# Patient Record
Sex: MALE | Race: WHITE | Employment: OTHER | URBAN - METROPOLITAN AREA
[De-identification: names, ages, dates, MRNs, and addresses within clinical notes are randomized per-mention and may not be internally consistent; named-entity substitution may affect disease eponyms.]

---

## 2017-01-24 ENCOUNTER — APPOINTMENT (OUTPATIENT)
Dept: LAB | Age: 62
End: 2017-01-24
Attending: PHYSICIAN ASSISTANT
Payer: COMMERCIAL

## 2017-01-24 PROCEDURE — G0480 DRUG TEST DEF 1-7 CLASSES: HCPCS | Performed by: PHYSICIAN ASSISTANT

## 2017-01-24 PROCEDURE — 80307 DRUG TEST PRSMV CHEM ANLYZR: CPT | Performed by: PHYSICIAN ASSISTANT

## 2017-03-09 RX ORDER — CARVEDILOL 3.12 MG/1
3.12 TABLET ORAL 2 TIMES DAILY
COMMUNITY
End: 2017-08-24 | Stop reason: ALTCHOICE

## 2017-03-09 RX ORDER — BUPRENORPHINE HCL 8 MG/1
1 TABLET SUBLINGUAL DAILY
COMMUNITY

## 2017-03-09 RX ORDER — LISINOPRIL 10 MG/1
10 TABLET ORAL DAILY
COMMUNITY
End: 2017-08-24 | Stop reason: ALTCHOICE

## 2017-03-14 ENCOUNTER — APPOINTMENT (OUTPATIENT)
Dept: PHYSICAL THERAPY | Facility: HOSPITAL | Age: 62
End: 2017-03-14
Attending: ORTHOPAEDIC SURGERY
Payer: COMMERCIAL

## 2017-03-21 ENCOUNTER — APPOINTMENT (OUTPATIENT)
Dept: LAB | Facility: HOSPITAL | Age: 62
End: 2017-03-21
Attending: ORTHOPAEDIC SURGERY
Payer: COMMERCIAL

## 2017-03-21 ENCOUNTER — APPOINTMENT (OUTPATIENT)
Dept: PHYSICAL THERAPY | Facility: HOSPITAL | Age: 62
End: 2017-03-21
Attending: ORTHOPAEDIC SURGERY
Payer: COMMERCIAL

## 2017-03-21 ENCOUNTER — HOSPITAL ENCOUNTER (OUTPATIENT)
Dept: GENERAL RADIOLOGY | Facility: HOSPITAL | Age: 62
Discharge: HOME | End: 2017-03-21
Attending: ORTHOPAEDIC SURGERY
Payer: COMMERCIAL

## 2017-03-21 DIAGNOSIS — M48.061 LUMBAR STENOSIS: ICD-10-CM

## 2017-03-21 LAB
ALBUMIN SERPL-MCNC: 3.7 G/DL (ref 3.5–4.8)
ALP LIVER SERPL-CCNC: 96 U/L (ref 45–117)
ALT SERPL-CCNC: 34 U/L (ref 17–63)
APTT PPP: 24.1 SECONDS (ref 25–34)
AST SERPL-CCNC: 21 U/L (ref 15–41)
BASOPHILS # BLD AUTO: 0.05 X10(3) UL (ref 0–0.1)
BASOPHILS NFR BLD AUTO: 0.9 %
BILIRUB SERPL-MCNC: 0.5 MG/DL (ref 0.1–2)
BILIRUB UR QL STRIP.AUTO: NEGATIVE
BUN BLD-MCNC: 9 MG/DL (ref 8–20)
CALCIUM BLD-MCNC: 8.9 MG/DL (ref 8.3–10.3)
CHLORIDE: 102 MMOL/L (ref 101–111)
CLARITY UR REFRACT.AUTO: CLEAR
CO2: 32 MMOL/L (ref 22–32)
COLOR UR AUTO: YELLOW
CREAT BLD-MCNC: 0.99 MG/DL (ref 0.7–1.3)
EOSINOPHIL # BLD AUTO: 0.09 X10(3) UL (ref 0–0.3)
EOSINOPHIL NFR BLD AUTO: 1.6 %
ERYTHROCYTE [DISTWIDTH] IN BLOOD BY AUTOMATED COUNT: 14.1 % (ref 11.5–16)
GLUCOSE BLD-MCNC: 93 MG/DL (ref 70–99)
GLUCOSE UR STRIP.AUTO-MCNC: NEGATIVE MG/DL
HCT VFR BLD AUTO: 51.3 % (ref 37–53)
HGB BLD-MCNC: 16.4 G/DL (ref 13–17)
IMMATURE GRANULOCYTE COUNT: 0.04 X10(3) UL (ref 0–1)
IMMATURE GRANULOCYTE RATIO %: 0.7 %
INR BLD: 0.92 (ref 0.89–1.11)
KETONES UR STRIP.AUTO-MCNC: NEGATIVE MG/DL
LYMPHOCYTES # BLD AUTO: 1.28 X10(3) UL (ref 0.9–4)
LYMPHOCYTES NFR BLD AUTO: 22.7 %
M PROTEIN MFR SERPL ELPH: 7.1 G/DL (ref 6.1–8.3)
MCH RBC QN AUTO: 28.8 PG (ref 27–33.2)
MCHC RBC AUTO-ENTMCNC: 32 G/DL (ref 31–37)
MCV RBC AUTO: 90.2 FL (ref 80–99)
MONOCYTES # BLD AUTO: 0.52 X10(3) UL (ref 0.1–0.6)
MONOCYTES NFR BLD AUTO: 9.2 %
NEUTROPHIL ABS PRELIM: 3.65 X10 (3) UL (ref 1.3–6.7)
NEUTROPHILS # BLD AUTO: 3.65 X10(3) UL (ref 1.3–6.7)
NEUTROPHILS NFR BLD AUTO: 64.9 %
NITRITE UR QL STRIP.AUTO: NEGATIVE
PH UR STRIP.AUTO: 5 [PH] (ref 4.5–8)
PLATELET # BLD AUTO: 227 10(3)UL (ref 150–450)
POTASSIUM SERPL-SCNC: 4.3 MMOL/L (ref 3.6–5.1)
PROT UR STRIP.AUTO-MCNC: NEGATIVE MG/DL
PSA SERPL DL<=0.01 NG/ML-MCNC: 12.3 SECONDS (ref 12–14.3)
RBC # BLD AUTO: 5.69 X10(6)UL (ref 4.3–5.7)
RBC UR QL AUTO: NEGATIVE
RED CELL DISTRIBUTION WIDTH-SD: 46 FL (ref 35.1–46.3)
SODIUM SERPL-SCNC: 140 MMOL/L (ref 136–144)
SP GR UR STRIP.AUTO: 1.02 (ref 1–1.03)
UROBILINOGEN UR STRIP.AUTO-MCNC: <2 MG/DL
WBC # BLD AUTO: 5.6 X10(3) UL (ref 4–13)

## 2017-03-21 PROCEDURE — 71020 XR CHEST PA + LAT CHEST (CPT=71020): CPT

## 2017-03-21 PROCEDURE — 87088 URINE BACTERIA CULTURE: CPT

## 2017-03-21 PROCEDURE — 81001 URINALYSIS AUTO W/SCOPE: CPT

## 2017-03-21 PROCEDURE — 36415 COLL VENOUS BLD VENIPUNCTURE: CPT

## 2017-03-21 PROCEDURE — 85730 THROMBOPLASTIN TIME PARTIAL: CPT

## 2017-03-21 PROCEDURE — 87081 CULTURE SCREEN ONLY: CPT

## 2017-03-21 PROCEDURE — 87186 SC STD MICRODIL/AGAR DIL: CPT

## 2017-03-21 PROCEDURE — 80053 COMPREHEN METABOLIC PANEL: CPT

## 2017-03-21 PROCEDURE — 87086 URINE CULTURE/COLONY COUNT: CPT

## 2017-03-21 PROCEDURE — 85025 COMPLETE CBC W/AUTO DIFF WBC: CPT

## 2017-03-21 PROCEDURE — 85610 PROTHROMBIN TIME: CPT

## 2017-03-30 ENCOUNTER — ANESTHESIA EVENT (OUTPATIENT)
Dept: SURGERY | Facility: HOSPITAL | Age: 62
DRG: 454 | End: 2017-03-30
Payer: COMMERCIAL

## 2017-03-30 NOTE — OR NURSING
Pre op chest xray result reviewed by Benita Sorensen/anesthesia. He requests note of medical clearance pre op. Faxed request to and left message on office voice mail of Dr.Sunny Roc Cottrell. Also faxed request to surgeon as PAVEL.

## 2017-04-13 ENCOUNTER — HOSPITAL ENCOUNTER (INPATIENT)
Facility: HOSPITAL | Age: 62
LOS: 3 days | Discharge: HOME HEALTH | DRG: 454 | End: 2017-04-16
Attending: ORTHOPAEDIC SURGERY | Admitting: ORTHOPAEDIC SURGERY
Payer: COMMERCIAL

## 2017-04-13 ENCOUNTER — SURGERY (OUTPATIENT)
Age: 62
End: 2017-04-13

## 2017-04-13 ENCOUNTER — ANESTHESIA (OUTPATIENT)
Dept: SURGERY | Facility: HOSPITAL | Age: 62
DRG: 454 | End: 2017-04-13
Payer: COMMERCIAL

## 2017-04-13 ENCOUNTER — APPOINTMENT (OUTPATIENT)
Dept: GENERAL RADIOLOGY | Facility: HOSPITAL | Age: 62
DRG: 454 | End: 2017-04-13
Attending: ORTHOPAEDIC SURGERY
Payer: COMMERCIAL

## 2017-04-13 DIAGNOSIS — D64.9 POSTOPERATIVE ANEMIA: ICD-10-CM

## 2017-04-13 DIAGNOSIS — D72.829 LEUKOCYTOSIS, UNSPECIFIED TYPE: ICD-10-CM

## 2017-04-13 DIAGNOSIS — M48.061 LUMBAR STENOSIS: Primary | ICD-10-CM

## 2017-04-13 PROCEDURE — 76001 XR FLUOROSCOPE EXAM >1 HR EXTENSIVE (CPT=76001): CPT

## 2017-04-13 PROCEDURE — 0SG30AJ FUSION OF LUMBOSACRAL JOINT WITH INTERBODY FUSION DEVICE, POSTERIOR APPROACH, ANTERIOR COLUMN, OPEN APPROACH: ICD-10-PCS | Performed by: ORTHOPAEDIC SURGERY

## 2017-04-13 PROCEDURE — 86900 BLOOD TYPING SEROLOGIC ABO: CPT | Performed by: ORTHOPAEDIC SURGERY

## 2017-04-13 PROCEDURE — 95940 IONM IN OPERATNG ROOM 15 MIN: CPT | Performed by: ORTHOPAEDIC SURGERY

## 2017-04-13 PROCEDURE — 86901 BLOOD TYPING SEROLOGIC RH(D): CPT | Performed by: ORTHOPAEDIC SURGERY

## 2017-04-13 PROCEDURE — 0SP004Z REMOVAL OF INTERNAL FIXATION DEVICE FROM LUMBAR VERTEBRAL JOINT, OPEN APPROACH: ICD-10-PCS | Performed by: ORTHOPAEDIC SURGERY

## 2017-04-13 PROCEDURE — 4A1134G MONITORING OF PERIPHERAL NERVOUS ELECTRICAL ACTIVITY, INTRAOPERATIVE, PERCUTANEOUS APPROACH: ICD-10-PCS | Performed by: ORTHOPAEDIC SURGERY

## 2017-04-13 PROCEDURE — 86850 RBC ANTIBODY SCREEN: CPT | Performed by: ORTHOPAEDIC SURGERY

## 2017-04-13 PROCEDURE — 95870 NDL EMG LMTD STD MUSC 1 XTR: CPT | Performed by: ORTHOPAEDIC SURGERY

## 2017-04-13 PROCEDURE — 95938 SOMATOSENSORY TESTING: CPT | Performed by: ORTHOPAEDIC SURGERY

## 2017-04-13 PROCEDURE — 88300 SURGICAL PATH GROSS: CPT | Performed by: ORTHOPAEDIC SURGERY

## 2017-04-13 PROCEDURE — 85027 COMPLETE CBC AUTOMATED: CPT | Performed by: PHYSICIAN ASSISTANT

## 2017-04-13 PROCEDURE — 0SG10A0 FUSION OF 2 OR MORE LUMBAR VERTEBRAL JOINTS WITH INTERBODY FUSION DEVICE, ANTERIOR APPROACH, ANTERIOR COLUMN, OPEN APPROACH: ICD-10-PCS | Performed by: ORTHOPAEDIC SURGERY

## 2017-04-13 DEVICE — OSTEOCEL PRO BONE MATRIX LG: Type: IMPLANTABLE DEVICE | Site: BACK | Status: FUNCTIONAL

## 2017-04-13 DEVICE — ROD BENDING DIGITIZER ARRAY: Type: IMPLANTABLE DEVICE | Site: BACK | Status: FUNCTIONAL

## 2017-04-13 DEVICE — RELINE LCK SCRW 5.5 OPEN TULIP: Type: IMPLANTABLE DEVICE | Site: BACK | Status: FUNCTIONAL

## 2017-04-13 DEVICE — DURAGENXS MATRIX DURAL REGENERATION MATRIX IS AN ABSORBABLE IMPLANT FOR REPAIR OF DURAL DEFECTS. DURAGENXS MATRIX IS AN EASY TO HANDLE, SOFT, WHITE, PLIABLE, NONFRIABLE, POROUS COLLAGEN MATRIX. DURAGENXS MATRIX IS SUPPLIED STERILE, NONPYROGENIC, FOR SINGLE USE IN DOUBLE PEEL PACKAGES IN A VARIETY OF SIZES.
Type: IMPLANTABLE DEVICE | Site: BACK | Status: FUNCTIONAL
Brand: DURAGEN XS™ DURAL REGENERATION MATRIX

## 2017-04-13 DEVICE — OBLIQUE TLIF 8X10X30MM 12D: Type: IMPLANTABLE DEVICE | Site: BACK | Status: FUNCTIONAL

## 2017-04-13 DEVICE — BONE GRAFT KIT 7510400 INFUSE MEDIUM
Type: IMPLANTABLE DEVICE | Site: BACK | Status: FUNCTIONAL
Brand: INFUSE® BONE GRAFT

## 2017-04-13 DEVICE — IMPLANTABLE DEVICE: Type: IMPLANTABLE DEVICE | Site: BACK | Status: FUNCTIONAL

## 2017-04-13 DEVICE — FLOSEAL SEALENT STERILE 10ML: Type: IMPLANTABLE DEVICE | Site: BACK | Status: FUNCTIONAL

## 2017-04-13 RX ORDER — BUPIVACAINE HYDROCHLORIDE AND EPINEPHRINE 5; 5 MG/ML; UG/ML
INJECTION, SOLUTION EPIDURAL; INTRACAUDAL; PERINEURAL AS NEEDED
Status: DISCONTINUED | OUTPATIENT
Start: 2017-04-13 | End: 2017-04-13 | Stop reason: HOSPADM

## 2017-04-13 RX ORDER — NALOXONE HYDROCHLORIDE 0.4 MG/ML
0.08 INJECTION, SOLUTION INTRAMUSCULAR; INTRAVENOUS; SUBCUTANEOUS
Status: DISCONTINUED | OUTPATIENT
Start: 2017-04-13 | End: 2017-04-15

## 2017-04-13 RX ORDER — DIAZEPAM 5 MG/ML
5 INJECTION, SOLUTION INTRAMUSCULAR; INTRAVENOUS ONCE
Status: COMPLETED | OUTPATIENT
Start: 2017-04-13 | End: 2017-04-13

## 2017-04-13 RX ORDER — HYDROMORPHONE HYDROCHLORIDE 1 MG/ML
0.2 INJECTION, SOLUTION INTRAMUSCULAR; INTRAVENOUS; SUBCUTANEOUS EVERY 2 HOUR PRN
Status: DISCONTINUED | OUTPATIENT
Start: 2017-04-13 | End: 2017-04-14

## 2017-04-13 RX ORDER — NALOXONE HYDROCHLORIDE 0.4 MG/ML
80 INJECTION, SOLUTION INTRAMUSCULAR; INTRAVENOUS; SUBCUTANEOUS AS NEEDED
Status: ACTIVE | OUTPATIENT
Start: 2017-04-13 | End: 2017-04-13

## 2017-04-13 RX ORDER — HYDROMORPHONE HYDROCHLORIDE 1 MG/ML
0.5 INJECTION, SOLUTION INTRAMUSCULAR; INTRAVENOUS; SUBCUTANEOUS EVERY 5 MIN PRN
Status: ACTIVE | OUTPATIENT
Start: 2017-04-13 | End: 2017-04-13

## 2017-04-13 RX ORDER — HYDROCODONE BITARTRATE AND ACETAMINOPHEN 10; 325 MG/1; MG/1
2 TABLET ORAL AS NEEDED
Status: DISCONTINUED | OUTPATIENT
Start: 2017-04-13 | End: 2017-04-13 | Stop reason: HOSPADM

## 2017-04-13 RX ORDER — ONDANSETRON 2 MG/ML
4 INJECTION INTRAMUSCULAR; INTRAVENOUS AS NEEDED
Status: ACTIVE | OUTPATIENT
Start: 2017-04-13 | End: 2017-04-13

## 2017-04-13 RX ORDER — DIPHENHYDRAMINE HYDROCHLORIDE 50 MG/ML
25 INJECTION INTRAMUSCULAR; INTRAVENOUS EVERY 4 HOURS PRN
Status: DISCONTINUED | OUTPATIENT
Start: 2017-04-13 | End: 2017-04-14

## 2017-04-13 RX ORDER — HYDROMORPHONE HYDROCHLORIDE 1 MG/ML
0.4 INJECTION, SOLUTION INTRAMUSCULAR; INTRAVENOUS; SUBCUTANEOUS EVERY 30 MIN PRN
Status: DISCONTINUED | OUTPATIENT
Start: 2017-04-13 | End: 2017-04-14

## 2017-04-13 RX ORDER — BACITRACIN 50000 [USP'U]/1
INJECTION, POWDER, LYOPHILIZED, FOR SOLUTION INTRAMUSCULAR AS NEEDED
Status: DISCONTINUED | OUTPATIENT
Start: 2017-04-13 | End: 2017-04-13 | Stop reason: HOSPADM

## 2017-04-13 RX ORDER — SULFAMETHOXAZOLE AND TRIMETHOPRIM 800; 160 MG/1; MG/1
1 TABLET ORAL EVERY 12 HOURS SCHEDULED
Status: DISCONTINUED | OUTPATIENT
Start: 2017-04-13 | End: 2017-04-13

## 2017-04-13 RX ORDER — HYDROMORPHONE HYDROCHLORIDE 1 MG/ML
INJECTION, SOLUTION INTRAMUSCULAR; INTRAVENOUS; SUBCUTANEOUS
Status: COMPLETED
Start: 2017-04-13 | End: 2017-04-13

## 2017-04-13 RX ORDER — HYDROCODONE BITARTRATE AND ACETAMINOPHEN 10; 325 MG/1; MG/1
1 TABLET ORAL EVERY 4 HOURS PRN
Status: DISCONTINUED | OUTPATIENT
Start: 2017-04-13 | End: 2017-04-13

## 2017-04-13 RX ORDER — CARVEDILOL 3.12 MG/1
3.12 TABLET ORAL 2 TIMES DAILY WITH MEALS
Status: DISCONTINUED | OUTPATIENT
Start: 2017-04-13 | End: 2017-04-16

## 2017-04-13 RX ORDER — DIPHENHYDRAMINE HYDROCHLORIDE 50 MG/ML
12.5 INJECTION INTRAMUSCULAR; INTRAVENOUS EVERY 4 HOURS PRN
Status: DISCONTINUED | OUTPATIENT
Start: 2017-04-13 | End: 2017-04-14

## 2017-04-13 RX ORDER — METOCLOPRAMIDE HYDROCHLORIDE 5 MG/ML
10 INJECTION INTRAMUSCULAR; INTRAVENOUS AS NEEDED
Status: ACTIVE | OUTPATIENT
Start: 2017-04-13 | End: 2017-04-13

## 2017-04-13 RX ORDER — NALBUPHINE HCL 10 MG/ML
2.5 AMPUL (ML) INJECTION EVERY 4 HOURS PRN
Status: DISCONTINUED | OUTPATIENT
Start: 2017-04-13 | End: 2017-04-15

## 2017-04-13 RX ORDER — LISINOPRIL 10 MG/1
10 TABLET ORAL DAILY
Status: DISCONTINUED | OUTPATIENT
Start: 2017-04-13 | End: 2017-04-16

## 2017-04-13 RX ORDER — HYDROMORPHONE HYDROCHLORIDE 1 MG/ML
0.4 INJECTION, SOLUTION INTRAMUSCULAR; INTRAVENOUS; SUBCUTANEOUS EVERY 2 HOUR PRN
Status: DISCONTINUED | OUTPATIENT
Start: 2017-04-13 | End: 2017-04-14

## 2017-04-13 RX ORDER — SODIUM PHOSPHATE, DIBASIC AND SODIUM PHOSPHATE, MONOBASIC 7; 19 G/133ML; G/133ML
1 ENEMA RECTAL ONCE AS NEEDED
Status: ACTIVE | OUTPATIENT
Start: 2017-04-13 | End: 2017-04-13

## 2017-04-13 RX ORDER — MEPERIDINE HYDROCHLORIDE 25 MG/ML
12.5 INJECTION INTRAMUSCULAR; INTRAVENOUS; SUBCUTANEOUS AS NEEDED
Status: DISCONTINUED | OUTPATIENT
Start: 2017-04-13 | End: 2017-04-13 | Stop reason: HOSPADM

## 2017-04-13 RX ORDER — DOCUSATE SODIUM 100 MG/1
100 CAPSULE, LIQUID FILLED ORAL 2 TIMES DAILY
Status: DISCONTINUED | OUTPATIENT
Start: 2017-04-13 | End: 2017-04-16

## 2017-04-13 RX ORDER — ONDANSETRON 2 MG/ML
4 INJECTION INTRAMUSCULAR; INTRAVENOUS EVERY 6 HOURS PRN
Status: DISCONTINUED | OUTPATIENT
Start: 2017-04-13 | End: 2017-04-15

## 2017-04-13 RX ORDER — DEXAMETHASONE SODIUM PHOSPHATE 4 MG/ML
4 VIAL (ML) INJECTION AS NEEDED
Status: ACTIVE | OUTPATIENT
Start: 2017-04-13 | End: 2017-04-13

## 2017-04-13 RX ORDER — ONDANSETRON 2 MG/ML
4 INJECTION INTRAMUSCULAR; INTRAVENOUS EVERY 4 HOURS PRN
Status: ACTIVE | OUTPATIENT
Start: 2017-04-13 | End: 2017-04-14

## 2017-04-13 RX ORDER — HYDROMORPHONE HYDROCHLORIDE 1 MG/ML
0.8 INJECTION, SOLUTION INTRAMUSCULAR; INTRAVENOUS; SUBCUTANEOUS EVERY 2 HOUR PRN
Status: DISCONTINUED | OUTPATIENT
Start: 2017-04-13 | End: 2017-04-14

## 2017-04-13 RX ORDER — OXYCODONE AND ACETAMINOPHEN 10; 325 MG/1; MG/1
1 TABLET ORAL EVERY 4 HOURS PRN
Status: DISCONTINUED | OUTPATIENT
Start: 2017-04-13 | End: 2017-04-14

## 2017-04-13 RX ORDER — POLYETHYLENE GLYCOL 3350 17 G/17G
17 POWDER, FOR SOLUTION ORAL DAILY PRN
Status: DISCONTINUED | OUTPATIENT
Start: 2017-04-13 | End: 2017-04-16

## 2017-04-13 RX ORDER — ATORVASTATIN CALCIUM 40 MG/1
80 TABLET, FILM COATED ORAL DAILY
Status: DISCONTINUED | OUTPATIENT
Start: 2017-04-13 | End: 2017-04-16

## 2017-04-13 RX ORDER — HYDROCODONE BITARTRATE AND ACETAMINOPHEN 10; 325 MG/1; MG/1
1 TABLET ORAL AS NEEDED
Status: DISCONTINUED | OUTPATIENT
Start: 2017-04-13 | End: 2017-04-13 | Stop reason: HOSPADM

## 2017-04-13 RX ORDER — SODIUM CHLORIDE, SODIUM LACTATE, POTASSIUM CHLORIDE, CALCIUM CHLORIDE 600; 310; 30; 20 MG/100ML; MG/100ML; MG/100ML; MG/100ML
INJECTION, SOLUTION INTRAVENOUS CONTINUOUS
Status: DISCONTINUED | OUTPATIENT
Start: 2017-04-13 | End: 2017-04-13

## 2017-04-13 RX ORDER — DIAZEPAM 5 MG/ML
INJECTION, SOLUTION INTRAMUSCULAR; INTRAVENOUS
Status: COMPLETED
Start: 2017-04-13 | End: 2017-04-13

## 2017-04-13 RX ORDER — GABAPENTIN 300 MG/1
300 CAPSULE ORAL 3 TIMES DAILY
COMMUNITY
End: 2017-04-19

## 2017-04-13 RX ORDER — MIDAZOLAM HYDROCHLORIDE 1 MG/ML
1 INJECTION INTRAMUSCULAR; INTRAVENOUS EVERY 5 MIN PRN
Status: ACTIVE | OUTPATIENT
Start: 2017-04-13 | End: 2017-04-13

## 2017-04-13 RX ORDER — SODIUM CHLORIDE 9 MG/ML
INJECTION, SOLUTION INTRAVENOUS CONTINUOUS
Status: DISCONTINUED | OUTPATIENT
Start: 2017-04-13 | End: 2017-04-16

## 2017-04-13 RX ORDER — DIPHENHYDRAMINE HCL 25 MG
25 CAPSULE ORAL EVERY 4 HOURS PRN
Status: DISCONTINUED | OUTPATIENT
Start: 2017-04-13 | End: 2017-04-14

## 2017-04-13 RX ORDER — DEXAMETHASONE SODIUM PHOSPHATE 10 MG/ML
10 INJECTION, SOLUTION INTRAMUSCULAR; INTRAVENOUS EVERY 8 HOURS
Status: COMPLETED | OUTPATIENT
Start: 2017-04-13 | End: 2017-04-14

## 2017-04-13 RX ORDER — DIAZEPAM 10 MG/1
10 TABLET ORAL EVERY 8 HOURS PRN
Status: DISCONTINUED | OUTPATIENT
Start: 2017-04-13 | End: 2017-04-16

## 2017-04-13 RX ORDER — CEFAZOLIN SODIUM 1 G/3ML
INJECTION, POWDER, FOR SOLUTION INTRAMUSCULAR; INTRAVENOUS
Status: DISCONTINUED | OUTPATIENT
Start: 2017-04-13 | End: 2017-04-13 | Stop reason: HOSPADM

## 2017-04-13 RX ORDER — ZOLPIDEM TARTRATE 10 MG/1
10 TABLET ORAL NIGHTLY PRN
COMMUNITY
End: 2018-04-24

## 2017-04-13 RX ORDER — PANTOPRAZOLE SODIUM 40 MG/1
40 TABLET, DELAYED RELEASE ORAL
Status: DISCONTINUED | OUTPATIENT
Start: 2017-04-14 | End: 2017-04-16

## 2017-04-13 RX ORDER — METOCLOPRAMIDE HYDROCHLORIDE 5 MG/ML
10 INJECTION INTRAMUSCULAR; INTRAVENOUS EVERY 6 HOURS PRN
Status: DISCONTINUED | OUTPATIENT
Start: 2017-04-13 | End: 2017-04-16

## 2017-04-13 RX ORDER — ATORVASTATIN CALCIUM 80 MG/1
80 TABLET, FILM COATED ORAL DAILY
COMMUNITY
End: 2018-09-25

## 2017-04-13 RX ORDER — BISACODYL 10 MG
10 SUPPOSITORY, RECTAL RECTAL
Status: DISCONTINUED | OUTPATIENT
Start: 2017-04-13 | End: 2017-04-16

## 2017-04-13 RX ORDER — HYDROCODONE BITARTRATE AND ACETAMINOPHEN 10; 325 MG/1; MG/1
2 TABLET ORAL EVERY 4 HOURS PRN
Status: DISCONTINUED | OUTPATIENT
Start: 2017-04-13 | End: 2017-04-13

## 2017-04-13 NOTE — PROGRESS NOTES
NURSING ADMISSION NOTE      Patient admitted via BED  Oriented to room. Safety precautions initiated. Bed in low position. Call light in reach. Dr. Marge Meyer notified of new consult, see orders. States will see patient tomorrow.    Dr. Rogelio Jain

## 2017-04-13 NOTE — PROGRESS NOTES
S: Moderate back pain with mild right thigh pain. No numbness or new weakness. No headaches. Inspection:  Awake alert No acute distress.  No difficulty breathing     Blood pressure 122/71, pulse 60, temperature 98.4 °F (36.9 °C), temperature source O

## 2017-04-13 NOTE — ANESTHESIA PREPROCEDURE EVALUATION
PRE-OP EVALUATION    Patient Name: Lakes[facility]    Pre-op Diagnosis: LUMBAR 2-3, LUMBAR 3-4, LUMBAR 4-5, LUMBAR 5-SACRAL 1 STENOSIS, PSUEDOARTHROSIS, STATUS POST BURST FRACTURE OPEN REDUCT INTERNAL FIXATION    Procedure(s):  LUMBAR 2-LUMBAR 3, L total) by mouth 3 (three) times daily.  (Patient taking differently: Take 300 mg by mouth as needed.  ) Disp: 90 capsule Rfl: 2   Esomeprazole Magnesium 40 MG Oral Capsule Delayed Release Take 40 mg by mouth every morning before breakfast. Disp:  Rfl:    al 03/21/2017   RDW 14.1 03/21/2017   .0 03/21/2017       Lab Results  Component Value Date    03/21/2017   K 4.3 03/21/2017    03/21/2017   CO2 32.0 03/21/2017   BUN 9 03/21/2017   CREATSERUM 0.99 03/21/2017   GLU 93 03/21/2017   CA 8.9 03

## 2017-04-13 NOTE — ANESTHESIA POSTPROCEDURE EVALUATION
[address] Patient Status:  Surgery Admit   Age/Gender 65year old male MRN [id number]   [facility] SURGERY Attending Shireen Moreno MD   [facility] Day [facility] 0 [facility]       Anesthesia Post-op Note    Procedu

## 2017-04-13 NOTE — BRIEF OP NOTE
[facility] SURGERY  Brief Op Note     Michail Brain Location: OR   CSN [id number] MRN [id number]   Admission Date 4/13/2017 Operation Date 4/13/2017   Attending Physician Dena Lan MD Operating Physician TEJA Pratt       Pre-Oper

## 2017-04-13 NOTE — CONSULTS
[facility] Pharmacy Note:  Pain Consult    Fannie Trejo is a 65year old male started on Dilaudid PCA by TEJA Raines. Pharmacy was consulted to review medication profile and to discontinue previously ordered narcotics and sedatives.     Knott Blocker

## 2017-04-13 NOTE — INTERVAL H&P NOTE
Pre-op Diagnosis: LUMBAR 2-3, LUMBAR 3-4, LUMBAR 4-5, LUMBAR 5-SACRAL 1 STENOSIS, PSUEDOARTHROSIS, STATUS POST BURST FRACTURE OPEN REDUCT INTERNAL FIXATION    The above referenced H&P was reviewed by Andrew Lucas MD on 4/13/2017, the patient was examined

## 2017-04-13 NOTE — PROGRESS NOTES
Pt with some erythema of rt and lt chest, tender to touch. Erythema has decreased since pt's admit to [facility]. Pt reports pain is decreasing as well. Floor nurse notified of reddness.

## 2017-04-14 PROCEDURE — 97161 PT EVAL LOW COMPLEX 20 MIN: CPT

## 2017-04-14 PROCEDURE — 97165 OT EVAL LOW COMPLEX 30 MIN: CPT

## 2017-04-14 PROCEDURE — 97535 SELF CARE MNGMENT TRAINING: CPT

## 2017-04-14 PROCEDURE — 97530 THERAPEUTIC ACTIVITIES: CPT

## 2017-04-14 RX ORDER — OXYCODONE AND ACETAMINOPHEN 10; 325 MG/1; MG/1
1 TABLET ORAL
Status: DISCONTINUED | OUTPATIENT
Start: 2017-04-14 | End: 2017-04-16

## 2017-04-14 RX ORDER — HYDROMORPHONE HYDROCHLORIDE 1 MG/ML
0.5 INJECTION, SOLUTION INTRAMUSCULAR; INTRAVENOUS; SUBCUTANEOUS EVERY 30 MIN PRN
Status: DISCONTINUED | OUTPATIENT
Start: 2017-04-14 | End: 2017-04-15

## 2017-04-14 RX ORDER — OXYCODONE AND ACETAMINOPHEN 10; 325 MG/1; MG/1
1 TABLET ORAL
Qty: 30 TABLET | Refills: 0 | Status: SHIPPED | OUTPATIENT
Start: 2017-04-14 | End: 2017-04-19

## 2017-04-14 RX ORDER — GABAPENTIN 300 MG/1
300 CAPSULE ORAL 3 TIMES DAILY
Status: DISCONTINUED | OUTPATIENT
Start: 2017-04-14 | End: 2017-04-14

## 2017-04-14 RX ORDER — ZOLPIDEM TARTRATE 10 MG/1
10 TABLET ORAL NIGHTLY
Status: DISCONTINUED | OUTPATIENT
Start: 2017-04-14 | End: 2017-04-16

## 2017-04-14 RX ORDER — ALPRAZOLAM 1 MG/1
1 TABLET ORAL 2 TIMES DAILY PRN
Status: DISCONTINUED | OUTPATIENT
Start: 2017-04-14 | End: 2017-04-16

## 2017-04-14 NOTE — CM/SW NOTE
04/14/17 1500   CM/SW Referral Data   Referral Source Nurse; Other  (PT)   Reason for Referral Discharge planning   Informant Patient;Edward Staff   Pertinent Medical Hx   Primary Care Physician Name EITAN Beaulieu   Patient Info   Patient's Mental Status Lorena

## 2017-04-14 NOTE — PROGRESS NOTES
Pain Service  POD1 s/p lumbar fusion  Patient rates pain 6-8/10 at incision - states pain is controlled with Dilaudid PCA  0.2 mg pt bolus  10 min lockout  1.2 mg hourly limit  8.2mg Dilaudid via PCA since OR    Patient follows with Dr Tyrone Meza for Ch

## 2017-04-14 NOTE — OCCUPATIONAL THERAPY NOTE
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 Pt lives with his sister.  Pt has a reacher and the reacher during LE dressing. Pt used the sock aid to monica socks. Issued the written patient education packet that has a list of local medical supply stores and a picture of the sock aid.   Supervision log rolling, supervision sit to stand, supervision t

## 2017-04-14 NOTE — PHYSICAL THERAPY NOTE
PHYSICAL THERAPY EVALUATION - INPATIENT     Room Number: 353/353-A  Evaluation Date: 4/14/2017  Type of Evaluation: Initial  Physician Order: PT Eval and Treat    Presenting Problem: S/p L2-L3,L3-L4,L4-L5,L5- S1 Anterolateral Fusion, Revision Decompres brace;Spine  Fall Risk: High fall risk    WEIGHT BEARING RESTRICTION  Weight Bearing Restriction: None                PAIN ASSESSMENT  Ratin  Location: Low back & surgical site + right groin  Management Techniques: Activity promotion; Body mechanics; Danielle r  Assistive Device: Rolling walker  Pattern: R Decreased stance time  Stoop/Curb Assistance: Not tested  Comment : Above score is based on FIM definations    Skilled Therapy Provided: Evaluation completed. Patient was instructed in post surgical precaution training;Stair training;Transfer training;Balance training  Rehab Potential : Good  Frequency (Obs): Daily  Number of Visits to Meet Established Goals: 2      CURRENT GOALS    Goal #1 Patient is able to demonstrate supine - sit EOB @ level: modified indepe

## 2017-04-14 NOTE — CONSULTS
[facility] Hospitalist Initial Consult       CC: post-op medical management s/p L2- S1 TLIF    History of Present Illness: Patient is a 65year old male with PMH sig for HTN, HL, GERD, CAD and hx of prostate CA who presents sp the above procedure.  He tolerated th diphenhydrAMINE **OR** DiphenhydrAMINE HCl, HYDROmorphone HCl PF **OR** HYDROmorphone HCl PF **OR** HYDROmorphone HCl PF, HYDROmorphone HCl PF, ondansetron HCl, Naloxone HCl, DiphenhydrAMINE HCl, Nalbuphine HCl, diazepam, oxyCODONE-acetaminophen    Allergi or recent cardiac symptoms, underwent pre-op eval     GERD  - Continue PPI    Prevention:  SCDs, bowel regimen      Outpatient records and previous hospital records reviewed. Thank you for allowing me to participate in the care of this patient.      Carolyn Chiang

## 2017-04-14 NOTE — PROGRESS NOTES
POD #1 spine newsletter and guidebook provided to patient. Reviewed indications, side effects of pain medication/narcotics and constipation prevention.  Stressed importance of increased fluids/roughage in diet, continued use stool softeners along with laxat

## 2017-04-14 NOTE — PROGRESS NOTES
S: Moderate back pain with some right anterior thigh pain. No numbness. No new weakness. No headaches. [place] out. Patient walked yesterday. Inspection:  Awake alert No acute distress.  No difficulty breathing     Blood pressure 130/65, pulse 80, te

## 2017-04-14 NOTE — PROGRESS NOTES
Patient  attended discharge spine education/snack class. Printed discharge education sheet provided and reviewed. Teach back done. Questions solicited and answered. Tolerated activity well.

## 2017-04-14 NOTE — PAYOR COMM NOTE
Attending Physician: Dana Vann MD    Review Type: ADMISSION   Reviewer:  Aurora Acuña      4/13    DIRECT FOR OR           Pre-Operative Diagnosis: LUMBAR 2-3, LUMBAR 3-4, LUMBAR 4-5, LUMBAR 5-SACRAL 1 STENOSIS, PSUEDOARTHROSIS, STATUS POST BURST

## 2017-04-14 NOTE — CM/SW NOTE
Call from Costa voss  Dayne Armijo. She can be reached at [phone] to assist with discharge planning if there are any needs.

## 2017-04-15 PROCEDURE — 97116 GAIT TRAINING THERAPY: CPT

## 2017-04-15 RX ORDER — CALCIUM CARBONATE 200(500)MG
500 TABLET,CHEWABLE ORAL
Status: DISCONTINUED | OUTPATIENT
Start: 2017-04-15 | End: 2017-04-16

## 2017-04-15 RX ORDER — MAGNESIUM HYDROXIDE/ALUMINUM HYDROXICE/SIMETHICONE 120; 1200; 1200 MG/30ML; MG/30ML; MG/30ML
30 SUSPENSION ORAL 4 TIMES DAILY PRN
Status: DISCONTINUED | OUTPATIENT
Start: 2017-04-15 | End: 2017-04-16

## 2017-04-15 RX ORDER — HYDROMORPHONE HYDROCHLORIDE 1 MG/ML
1 INJECTION, SOLUTION INTRAMUSCULAR; INTRAVENOUS; SUBCUTANEOUS EVERY 2 HOUR PRN
Status: DISCONTINUED | OUTPATIENT
Start: 2017-04-15 | End: 2017-04-16

## 2017-04-15 RX ORDER — HYDROMORPHONE HYDROCHLORIDE 1 MG/ML
0.5 INJECTION, SOLUTION INTRAMUSCULAR; INTRAVENOUS; SUBCUTANEOUS EVERY 2 HOUR PRN
Status: DISCONTINUED | OUTPATIENT
Start: 2017-04-15 | End: 2017-04-16

## 2017-04-15 NOTE — PHYSICAL THERAPY NOTE
PHYSICAL THERAPY TREATMENT NOTE - INPATIENT    Room Number: 353/353-A     Session: 2   Number of Visits to Meet Established Goals: 2    Presenting Problem: S/p L2-L3,L3-L4,L4-L5,L5- S1 Anterolateral Fusion, Revision Decompression fusion on 04/13/17    Pro on the side of the bed?: A Little   How much help from another person does the patient currently need. ..   -   Moving to and from a bed to a chair (including a wheelchair)?: None   -   Need to walk in hospital room?: None   -   Climbing 3-5 steps with a ra brace; Endurance; Energy conservation;Patient education;Gait training;Stair training;Transfer training;Balance training  Rehab Potential : Good  Frequency (Obs): Daily    CURRENT GOALS      Goal #1  Patient is able to demonstrate supine - sit EOB @ level: mo

## 2017-04-15 NOTE — PROGRESS NOTES
Pain Service    POD2 s/p lumbar fusion    Patient rates pain 5/10 at incision - states pain is controlled with scheduled percocet. Patient is ambulating in hallway with walker and minimal assistance.     Pt reports he has not been using PCA and is agreeable

## 2017-04-15 NOTE — PROGRESS NOTES
[facility] Hospitalist Progress Note                                                                   [address] [facility]  2/4/1955    CC:  Fu post op    Interval History:  - Feels well, though Urinating OK    Post-op pain, expected  - DC PCA today- pain service following  - Continue PO meds, scheduled + prn  - Follows with pain clinic as outpatient    HTN  - BP stable, continue current home regimen    CAD s/p MI in [facility] 44 BB and statin

## 2017-04-15 NOTE — PLAN OF CARE
Patient remained alert and oriented to person, place, time and situation this shift. On room air with continuous pulse oximetry monitoring and oxygen saturations remaining greater than 92%. Telemetry monitoring with NSR.   ELLA protocol maintained without

## 2017-04-15 NOTE — PROGRESS NOTES
S: He has controlled back pain some right hip pain. Overall he is feeling well. Pos flatus no BM yet. No abd pain. Inspection:  Awake alert No acute distress.  No difficulty breathing     Blood pressure 112/55, pulse 70, temperature 98.1 °F (36.7 °C),

## 2017-04-16 VITALS
RESPIRATION RATE: 18 BRPM | WEIGHT: 185 LBS | OXYGEN SATURATION: 100 % | SYSTOLIC BLOOD PRESSURE: 138 MMHG | TEMPERATURE: 98 F | DIASTOLIC BLOOD PRESSURE: 70 MMHG | HEIGHT: 74 IN | BODY MASS INDEX: 23.74 KG/M2 | HEART RATE: 73 BPM

## 2017-04-16 NOTE — PROGRESS NOTES
Dressing and drain removed by physician  Patient showered after having dressings covered by nurse  Ambulated in halls   New dressing applied prior to discharge.  steristrips reapplied  Materials for dressing changes given prior to discharge

## 2017-04-16 NOTE — PROGRESS NOTES
S: He is having improved back pain He still has right leg pain. No numbness. He wants to go home. Inspection:  Awake alert No acute distress.  No difficulty breathing     Blood pressure 138/70, pulse 73, temperature 97.9 °F (36.6 °C), temperature sourc

## 2017-04-16 NOTE — PROGRESS NOTES
NURSING DISCHARGE NOTE    Discharged Home via Wheelchair. Accompanied by Family member  Belongings Taken by patient/family. All discharge instructions reviewed with patient  Scripts sent with patient.    All questions answered

## 2017-04-16 NOTE — CM/SW NOTE
SW notified of anticipated discharge today. Discharge AVS sent via ECIN. / to remain available for support and/or discharge planning.      [address]  P: [phone]  F: [phone]

## 2017-04-16 NOTE — PROGRESS NOTES
[facility] Hospitalist Progress Note                                                                   [address] [facility]  2/4/1955    CC: Fu post op    Interval History:  - No CP/SOB.     [place] Vangie meds, scheduled + prn  - Follows with pain clinic as outpatient    HTN  - BP stable, continue current home regimen    CAD s/p MI in [facility] 44 BB and statin, hold ASA until OK with surgery to restart  - No current or recent cardiac symptoms, underwen

## 2017-04-17 NOTE — CM/SW NOTE
04/17/17 0800   Discharge disposition   Discharged to: Home-Health   Name of Mell Bhagat 70   Discharge transportation Private car   DC 4/16/17

## 2017-04-25 NOTE — OPERATIVE REPORT
[facility]    PATIENT'S NAME: Tara Flores   ATTENDING PHYSICIAN: Aj Baires M.D. OPERATING PHYSICIAN: Aj Baires M.D.    PATIENT ACCOUNT#:   [de-identified]    LOCATION:  [address] [place] [address] [facility]  MEDICAL RECORD #:   [id number]       DATE OF BI done at all indicated levels. Curettes were used to gain access to the canal.  Quintana ball was used to dissect ligamentum flavum away and free from the neural elements.   Then 4 and 5-mm Kerrisons were used to resect thickened ligamentum flavum and central figure-of-eight 1-0 Vicryl; 2-0 Vicryl was used for the subcutaneous tissues, and running 3-0 subcuticular stitches were applied. Steri-Strips were applied. Sterile dressings were applied.   The patient was extubated and taken to the recovery room in [facility] structures. The patient still had significant stenosis at L4-5, L3-4, L2-3, and at L5-S1. We performed osteotomies of the Palma-Herrera type with osteotome at L2-3, L3-4, L4-5, and L5-S1. The L5-S1 roots were completely freed.   We then performed an int

## 2017-04-25 NOTE — PAYOR COMM NOTE
Review Type: CONTINUED STAY  Reviewer: Francisco J Velasco     Date: April 25, 2017 - 1:28 PM  Payor: [id number] Number: [id number]  Admit date: 4/13/2017  6:04 AM        Discharge date:  4/16/17

## 2017-04-27 NOTE — DISCHARGE SUMMARY
[facility]  Discharge Summary    Barrera Perkins Patient Status:  Inpatient    1955 MRN [id number]   [facility] 3SW-A Attending No att. providers found   Hosp Day # 3 PCP LASHON Gongora     Date of Admission: 2017    D Potential duplicate medications found. Please discuss with provider. CONTINUE these medications which have NOT CHANGED    gabapentin 300 MG Oral Cap  Take 300 mg by mouth 3 (three) times daily.   , Historical    Oxymetazoline HCl (NASAL NA)  by Nasal r

## 2017-08-24 PROBLEM — M77.11 LATERAL EPICONDYLITIS OF BOTH ELBOWS: Status: ACTIVE | Noted: 2017-08-24

## 2017-08-24 PROBLEM — M77.12 LATERAL EPICONDYLITIS OF BOTH ELBOWS: Status: ACTIVE | Noted: 2017-08-24

## 2017-09-15 PROBLEM — F11.90 CHRONIC, CONTINUOUS USE OF OPIOIDS: Status: ACTIVE | Noted: 2017-09-15

## 2018-08-07 PROBLEM — R73.01 IMPAIRED FASTING GLUCOSE: Status: ACTIVE | Noted: 2017-01-17

## 2018-08-07 PROBLEM — Z87.891 FORMER TOBACCO USE: Status: ACTIVE | Noted: 2017-01-17

## 2018-08-07 PROBLEM — I25.10 CORONARY ARTERY DISEASE INVOLVING NATIVE CORONARY ARTERY OF NATIVE HEART WITHOUT ANGINA PECTORIS: Status: ACTIVE | Noted: 2017-01-17

## 2018-08-07 PROBLEM — F41.1 GENERALIZED ANXIETY DISORDER: Status: ACTIVE | Noted: 2017-01-17

## 2018-08-07 PROBLEM — E55.9 VITAMIN D DEFICIENCY: Status: ACTIVE | Noted: 2017-01-17

## 2018-08-07 PROBLEM — K21.9 GERD WITHOUT ESOPHAGITIS: Status: ACTIVE | Noted: 2017-01-17

## 2018-08-07 PROBLEM — E29.1 TESTICULAR HYPOFUNCTION: Status: ACTIVE | Noted: 2018-01-18

## 2018-08-07 PROBLEM — C61 PROSTATE CANCER (HCC): Status: ACTIVE | Noted: 2017-03-17

## 2018-08-07 PROBLEM — F11.20 OPIOID DEPENDENCE IN CONTROLLED ENVIRONMENT (HCC): Status: ACTIVE | Noted: 2017-09-15

## 2018-08-07 PROBLEM — N48.6 PEYRONIE DISEASE: Status: ACTIVE | Noted: 2017-03-17

## 2018-08-07 PROBLEM — Z87.81 HISTORY OF COMPRESSION FRACTURE OF SPINE: Status: ACTIVE | Noted: 2017-01-17

## 2018-08-07 PROBLEM — M47.816 LUMBAR ARTHROPATHY: Status: ACTIVE | Noted: 2017-03-31

## 2018-08-07 PROBLEM — R79.89 LOW TESTOSTERONE: Status: ACTIVE | Noted: 2018-08-07

## 2018-08-07 PROBLEM — I10 ESSENTIAL HYPERTENSION: Status: ACTIVE | Noted: 2018-08-07

## 2018-08-07 PROBLEM — F13.20 BENZODIAZEPINE DEPENDENCE, EPISODIC (HCC): Status: ACTIVE | Noted: 2018-08-07

## 2018-08-07 PROBLEM — F51.01 PRIMARY INSOMNIA: Status: ACTIVE | Noted: 2017-01-17

## 2018-08-07 PROCEDURE — 80414 TESTOSTERONE RESPONSE PANEL: CPT | Performed by: INTERNAL MEDICINE

## 2018-08-07 PROCEDURE — 84402 ASSAY OF FREE TESTOSTERONE: CPT | Performed by: INTERNAL MEDICINE

## 2019-01-01 PROBLEM — Z85.46 PERSONAL HISTORY OF PROSTATE CANCER: Status: ACTIVE | Noted: 2019-01-01

## 2019-01-01 PROBLEM — C61 PROSTATE CANCER (HCC): Status: RESOLVED | Noted: 2017-03-17 | Resolved: 2019-01-01

## 2019-01-25 PROCEDURE — 84403 ASSAY OF TOTAL TESTOSTERONE: CPT | Performed by: INTERNAL MEDICINE

## 2019-01-25 PROCEDURE — 84402 ASSAY OF FREE TESTOSTERONE: CPT | Performed by: INTERNAL MEDICINE

## 2019-05-31 PROCEDURE — 84402 ASSAY OF FREE TESTOSTERONE: CPT | Performed by: INTERNAL MEDICINE

## 2019-05-31 PROCEDURE — 84403 ASSAY OF TOTAL TESTOSTERONE: CPT | Performed by: INTERNAL MEDICINE

## 2019-06-08 PROBLEM — I25.2 STATUS POST MYOCARDIAL INFARCTION: Status: ACTIVE | Noted: 2019-06-08

## 2019-06-08 PROBLEM — Z98.62 STATUS POST ANGIOPLASTY: Status: ACTIVE | Noted: 2019-06-08

## 2020-01-06 PROBLEM — E78.00 HYPERCHOLESTEREMIA: Status: ACTIVE | Noted: 2020-01-06

## 2020-01-09 PROBLEM — M77.12 LATERAL EPICONDYLITIS OF BOTH ELBOWS: Status: RESOLVED | Noted: 2017-08-24 | Resolved: 2020-01-09

## 2020-01-09 PROBLEM — M77.11 LATERAL EPICONDYLITIS OF BOTH ELBOWS: Status: RESOLVED | Noted: 2017-08-24 | Resolved: 2020-01-09

## 2020-01-28 PROBLEM — I71.9 PENETRATING ATHEROSCLEROTIC ULCER OF AORTA (HCC): Status: ACTIVE | Noted: 2020-01-28

## 2020-01-28 PROBLEM — R94.39 ABNORMAL CARDIOVASCULAR STRESS TEST: Status: ACTIVE | Noted: 2020-01-28

## 2020-01-28 PROBLEM — R91.1 LUNG NODULE: Status: ACTIVE | Noted: 2020-01-28

## 2020-06-18 PROBLEM — I71.9 PENETRATING ULCER OF AORTA (HCC): Status: ACTIVE | Noted: 2020-02-06

## 2020-06-18 PROBLEM — Z87.891 FORMER SMOKER: Status: ACTIVE | Noted: 2017-10-11

## 2020-08-20 PROBLEM — D58.2 ELEVATED HEMOGLOBIN (HCC): Status: ACTIVE | Noted: 2020-08-20

## 2020-09-11 PROBLEM — Z01.810 PREOPERATIVE CARDIOVASCULAR EXAMINATION: Status: ACTIVE | Noted: 2020-09-11

## 2021-04-18 PROBLEM — Z91.199 NO-SHOW FOR APPOINTMENT: Status: ACTIVE | Noted: 2021-04-18

## 2021-04-18 PROBLEM — Z53.29 NO-SHOW FOR APPOINTMENT: Status: ACTIVE | Noted: 2021-04-18

## 2022-02-08 PROBLEM — D58.2 ELEVATED HEMOGLOBIN (HCC): Status: RESOLVED | Noted: 2020-08-20 | Resolved: 2022-02-08

## 2022-05-27 DIAGNOSIS — I25.10 CAD (CORONARY ARTERY DISEASE): Primary | ICD-10-CM

## 2022-05-27 DIAGNOSIS — K63.89 PCI (PNEUMATOSIS CYSTOIDES INTESTINALIS): ICD-10-CM

## 2022-06-03 ENCOUNTER — HOSPITAL ENCOUNTER (OUTPATIENT)
Dept: CARDIOLOGY | Age: 67
Discharge: HOME | End: 2022-06-03
Attending: INTERNAL MEDICINE

## 2022-06-03 ENCOUNTER — HOSPITAL ENCOUNTER (OUTPATIENT)
Dept: NUCLEAR MEDICINE | Age: 67
Discharge: HOME | End: 2022-06-03
Attending: INTERNAL MEDICINE

## 2022-06-03 VITALS — DIASTOLIC BLOOD PRESSURE: 71 MMHG | SYSTOLIC BLOOD PRESSURE: 126 MMHG | HEART RATE: 59 BPM

## 2022-06-03 DIAGNOSIS — I25.10 CAD (CORONARY ARTERY DISEASE): ICD-10-CM

## 2022-06-03 DIAGNOSIS — K63.89 PCI (PNEUMATOSIS CYSTOIDES INTESTINALIS): ICD-10-CM

## 2022-06-03 PROCEDURE — 78452 HT MUSCLE IMAGE SPECT MULT: CPT

## 2022-06-03 PROCEDURE — 10006150 HB RX 343: Performed by: INTERNAL MEDICINE

## 2022-06-03 PROCEDURE — 93017 CV STRESS TEST TRACING ONLY: CPT

## 2022-06-03 PROCEDURE — A9502 TC99M TETROFOSMIN: HCPCS | Performed by: INTERNAL MEDICINE

## 2022-06-03 PROCEDURE — G1004 CDSM NDSC: HCPCS

## 2022-06-03 PROCEDURE — 10002800 HB RX 250 W HCPCS: Performed by: INTERNAL MEDICINE

## 2022-06-03 RX ORDER — REGADENOSON 0.08 MG/ML
0.4 INJECTION, SOLUTION INTRAVENOUS ONCE
Status: COMPLETED | OUTPATIENT
Start: 2022-06-03 | End: 2022-06-03

## 2022-06-03 RX ADMIN — TETROFOSMIN 20.6 MILLICURIE: 1.38 INJECTION, POWDER, LYOPHILIZED, FOR SOLUTION INTRAVENOUS at 14:16

## 2022-06-03 RX ADMIN — REGADENOSON 0.4 MG: 0.08 INJECTION, SOLUTION INTRAVENOUS at 14:14

## 2022-06-03 RX ADMIN — TETROFOSMIN 7.24 MILLICURIE: 1.38 INJECTION, POWDER, LYOPHILIZED, FOR SOLUTION INTRAVENOUS at 13:20

## 2022-06-08 LAB — STRESS TARGET HR: 154 BPM

## 2023-04-13 ENCOUNTER — MED REC SCAN ONLY (OUTPATIENT)
Dept: PHYSICAL MEDICINE AND REHAB | Facility: CLINIC | Age: 68
End: 2023-04-13

## 2023-09-11 PROBLEM — I25.10 CORONARY ARTERY DISEASE: Status: ACTIVE | Noted: 2022-04-11

## 2023-09-11 PROBLEM — N52.39 POST-PROCEDURAL ERECTILE DYSFUNCTION: Status: ACTIVE | Noted: 2017-03-17

## 2023-09-11 PROBLEM — E29.1 TESTICULAR HYPOFUNCTION: Status: ACTIVE | Noted: 2018-01-18

## 2023-09-11 PROBLEM — R91.1 LUNG NODULE: Status: ACTIVE | Noted: 2020-01-28

## 2023-09-11 PROBLEM — R94.39 ABNORMAL CARDIOVASCULAR STRESS TEST: Status: ACTIVE | Noted: 2020-01-28

## 2023-09-11 PROBLEM — Z98.62 STATUS POST ANGIOPLASTY: Status: ACTIVE | Noted: 2019-06-08

## 2023-09-11 PROBLEM — C61 PROSTATE CANCER (CMD): Status: ACTIVE | Noted: 2017-03-17

## 2023-09-11 PROBLEM — I21.4 NON-ST ELEVATION (NSTEMI) MYOCARDIAL INFARCTION (CMD): Status: ACTIVE | Noted: 2017-03-17

## 2023-09-11 PROBLEM — Z85.46 PERSONAL HISTORY OF PROSTATE CANCER: Status: ACTIVE | Noted: 2019-01-01

## 2023-09-11 PROBLEM — E55.9 VITAMIN D DEFICIENCY: Status: ACTIVE | Noted: 2017-01-17

## 2023-09-11 PROBLEM — M54.50 LOW BACK PAIN: Status: ACTIVE | Noted: 2017-03-17

## 2023-09-11 PROBLEM — N48.6 PEYRONIE DISEASE: Status: ACTIVE | Noted: 2017-03-17

## 2023-09-11 PROBLEM — D75.1 POLYCYTHEMIA: Status: ACTIVE | Noted: 2022-08-24

## 2023-09-11 PROBLEM — F41.1 GENERALIZED ANXIETY DISORDER: Status: ACTIVE | Noted: 2017-01-17

## 2023-09-11 PROBLEM — M75.82 TENDINITIS OF LEFT ROTATOR CUFF: Status: ACTIVE | Noted: 2022-03-11

## 2023-09-11 PROBLEM — F11.20 OPIOID DEPENDENCE IN CONTROLLED ENVIRONMENT (CMS/HCC): Status: ACTIVE | Noted: 2017-09-15

## 2023-09-11 PROBLEM — I25.2 STATUS POST MYOCARDIAL INFARCTION: Status: ACTIVE | Noted: 2019-06-08

## 2023-09-11 PROBLEM — I71.9 PENETRATING ULCER OF AORTA (CMD): Status: ACTIVE | Noted: 2020-01-28

## 2023-09-11 PROBLEM — N48.33 PRIAPISM, DRUG-INDUCED: Status: ACTIVE | Noted: 2017-03-17

## 2023-09-11 PROBLEM — K21.9 GERD WITHOUT ESOPHAGITIS: Status: ACTIVE | Noted: 2017-01-17

## 2023-09-11 PROBLEM — I10 HYPERTENSION: Status: ACTIVE | Noted: 2023-06-22

## 2023-09-11 PROBLEM — Z87.891 FORMER TOBACCO USE: Status: ACTIVE | Noted: 2017-01-17

## 2023-09-11 PROBLEM — F13.20 BENZODIAZEPINE DEPENDENCE, EPISODIC (CMD): Status: ACTIVE | Noted: 2018-08-07

## 2023-09-11 PROBLEM — R79.89 LOW TESTOSTERONE: Status: ACTIVE | Noted: 2017-03-17

## 2023-09-11 PROBLEM — M75.42 IMPINGEMENT SYNDROME OF LEFT SHOULDER: Status: ACTIVE | Noted: 2022-03-11

## 2023-09-11 PROBLEM — M48.061 LUMBAR STENOSIS: Status: ACTIVE | Noted: 2017-04-13

## 2023-09-11 PROBLEM — T46.6X5A STATIN MYOPATHY: Status: ACTIVE | Noted: 2017-03-17

## 2023-09-11 PROBLEM — Z87.81 HISTORY OF COMPRESSION FRACTURE OF SPINE: Status: ACTIVE | Noted: 2017-01-17

## 2023-09-11 PROBLEM — E78.00 PURE HYPERCHOLESTEROLEMIA: Status: ACTIVE | Noted: 2017-03-17

## 2023-09-11 PROBLEM — Z91.199 NO-SHOW FOR APPOINTMENT: Status: ACTIVE | Noted: 2021-04-18

## 2023-09-11 PROBLEM — F51.01 PRIMARY INSOMNIA: Status: ACTIVE | Noted: 2017-01-17

## 2023-09-11 PROBLEM — R73.01 IMPAIRED FASTING GLUCOSE: Status: ACTIVE | Noted: 2017-01-17

## 2023-09-11 PROBLEM — G72.0 STATIN MYOPATHY: Status: ACTIVE | Noted: 2017-03-17

## 2023-09-11 PROBLEM — Z01.810 ENCOUNTER FOR PREPROCEDURAL CARDIOVASCULAR EXAMINATION: Status: ACTIVE | Noted: 2017-03-17

## 2023-09-11 PROBLEM — Z87.891 FORMER SMOKER: Status: ACTIVE | Noted: 2017-10-11

## (undated) DEVICE — BIPOLAR SEALER 23-112-1 AQM 6.0: Brand: AQUAMANTYS™

## (undated) DEVICE — LAMINECTOMY ARM CRADLE FOAM POSITIONER: Brand: CARDINAL HEALTH

## (undated) DEVICE — DRAIN RELIAVAC W/DRN MED 1/8

## (undated) DEVICE — Device

## (undated) DEVICE — SHEET,DRAPE,70X100,STERILE: Brand: MEDLINE

## (undated) DEVICE — 3M™ MICROFOAM™ TAPE 1528-4: Brand: 3M™ MICROFOAM™

## (undated) DEVICE — FLOSEAL SEALENT STERILE 10ML

## (undated) DEVICE — LAPAROTOMY SPONGE - RF AND X-RAY DETECTABLE PRE-WASHED: Brand: SITUATE

## (undated) DEVICE — SUTURE VICRYL 1 OS-6

## (undated) DEVICE — TRANSPOSAL ULTRAFLEX DUO/QUAD ULTRA CART MANIFOLD

## (undated) DEVICE — SOL  .9 1000ML BTL

## (undated) DEVICE — UNDYED BRAIDED (POLYGLACTIN 910), SYNTHETIC ABSORBABLE SUTURE: Brand: COATED VICRYL

## (undated) DEVICE — DRAPE TABLE COVER 44X90 TC-10

## (undated) DEVICE — NVM5 NEEDLE MODULE M/E

## (undated) DEVICE — WRAP COOLING BACK W/NO PILLOW

## (undated) DEVICE — PAD SACRAL SPAN AID

## (undated) DEVICE — COVER,TABLE,HVY DUTY,EXT,77"X96",STRL: Brand: MEDLINE

## (undated) DEVICE — NUVAMAP O.R. TECHNOLOGY

## (undated) DEVICE — DRAPE C-ARM UNIVERSAL

## (undated) DEVICE — DRAPE,T,LAPARO,TRANS,STERILE: Brand: MEDLINE

## (undated) DEVICE — LIGHT HANDLE

## (undated) DEVICE — SUTURE VICRYL 2-0 FSL

## (undated) DEVICE — LAMINECTOMY CDS: Brand: MEDLINE INDUSTRIES, INC.

## (undated) DEVICE — GRAFT DELIVERY SYS MODULE

## (undated) DEVICE — NVM5 NEEDLE MODULE S

## (undated) DEVICE — GLOVE SURG TRIUMPH SZ 7

## (undated) DEVICE — C-ARMOR C-ARM EQUIPMENT COVERS CLEAR STERILE UNIVERSAL FIT 12 PER CASE: Brand: C-ARMOR

## (undated) DEVICE — RELINE POWER

## (undated) DEVICE — TAPE CLOTH ADHESIVE 3

## (undated) DEVICE — NVM5 XLIF ACTIVATOR ELEC KIT

## (undated) DEVICE — DRILL SRG OIL CRTDG MAESTRO

## (undated) DEVICE — BLADE SAW KM33-11

## (undated) DEVICE — 3.0MM PRECISION NEURO (MATCH HEAD)

## (undated) DEVICE — GLOVE SURG TRIUMPH SZ 71/2

## (undated) DEVICE — KENDALL SCD EXPRESS SLEEVES, THIGH LENGTH, MEDIUM: Brand: KENDALL SCD

## (undated) NOTE — LETTER
[facility] Department  Phone: [phone]  Right Fax: [phone]  [facility] 20 By:  Sunny Butterfield RN Date: 3/28/17    Patient Name: Lilli Rice  Surgery Date: 4/13/2017    CSN: [id number]  Medical Record: [id number]

## (undated) NOTE — IP AVS SNAPSHOT
[facility] [place] [address] [place], [address] ~ [phone]                Discharge Summary   4/13/2017    [address] [facility]           Admission Information        Provider Department    4/13/2017 TABATHA Montague MD  3sw-A Instructions Authorizing Provider    Morning Afternoon Evening As Needed    ALPRAZolam 1 MG Tabs   Commonly known as:  XANAX        Take 1 mg by mouth 2 (two) times daily as needed for Sleep.                                   Atorvastatin Calcium 80 MG Tab Zolpidem Tartrate 10 MG Tabs   Last time this was given:  10 mg on 4/15/2017  9:05 PM   Commonly known as:  AMBIEN        Take 10 mg by mouth nightly as needed for Sleep.                                  STOP taking these medications     aspirin 325 ? Use when out of bed except when showering. ? May wear even when toileting. ? Anticipate wearing for 6-12 weeks after surgery; exact time to be determined by surgeon. Discuss at office visit.   ? Put brace on:  o  when sitting/standing at side of bed ? Eat a high fiber diet (bran, vegetables, fruit). ? Use an over the counter stool softener such as Colace or senakot while taking narcotics to prevent constipation; use laxatives such as Miralax or Milk of Magnesia as needed. ?  An enema or suppository m When to contact your surgeon  ? Temp > 101F; Take your temperature twice a day  ? Increased pain, swelling, redness, or any drainage to incision. ? Separation of incision. .  ? Sudden reappearance of pain that won’t go away with pain medication.   ? New num Discharge Orders     Future Labs/Procedures Expected by Expires    EKG 12-LEAD  3/9/2017 (Approximate) 3/8/2018    SPINE CLASS  3/9/2017 5/9/2017    URINALYSIS, ROUTINE  3/9/2017 3/9/2018    Questions:      Spec comment:      CBC WITH DIFFERENTIAL WITH KAIDEN We are concerned for your overall well being:    - If you are a smoker or have smoked in the last 12 months, we encourage you to explore options for quitting.     - If you have concerns related to behavioral health issues or thoughts of harming yourself, Atorvastatin Calcium 80 MG Oral Tab       Use: Lower cholesterol, protect your heart   Most common side effects: Dizziness, constipation, abnormal liver function   What to report to your healthcare team:  Dizziness, muscle aches, constipation           Ot What to report to your healthcare provider: Head or mouth pain, coating on mouth/tongue, shortness of breath, sensation of heart racing, rash, or itching           Steroids     Testosterone cypionate 200 MG/ML Intramuscular Solution         Use:  To treat i

## (undated) NOTE — LETTER
[facility] Department  Phone: [phone]  Right Fax: [phone]  ABNORMAL VALUES FAX    Sent By:  Ana Isbell RN Date: 3/21/17    Patient Name: Sharon Chao  Surgery Date: 4/13/2017    CSN: [id number]  Medical Record: E